# Patient Record
Sex: FEMALE | Race: OTHER | NOT HISPANIC OR LATINO | URBAN - METROPOLITAN AREA
[De-identification: names, ages, dates, MRNs, and addresses within clinical notes are randomized per-mention and may not be internally consistent; named-entity substitution may affect disease eponyms.]

---

## 2024-11-21 ENCOUNTER — APPOINTMENT (OUTPATIENT)
Dept: ENDOCRINOLOGY | Facility: CLINIC | Age: 54
End: 2024-11-21
Payer: COMMERCIAL

## 2024-12-26 ENCOUNTER — APPOINTMENT (OUTPATIENT)
Dept: ENDOCRINOLOGY | Facility: CLINIC | Age: 54
End: 2024-12-26
Payer: COMMERCIAL

## 2024-12-26 VITALS — WEIGHT: 220 LBS | HEIGHT: 62 IN | BODY MASS INDEX: 40.48 KG/M2

## 2024-12-26 DIAGNOSIS — L65.9 HAIR THINNING: ICD-10-CM

## 2024-12-26 DIAGNOSIS — E66.01 CLASS 3 SEVERE OBESITY WITH SERIOUS COMORBIDITY AND BODY MASS INDEX (BMI) OF 40.0 TO 44.9 IN ADULT, UNSPECIFIED OBESITY TYPE: Primary | ICD-10-CM

## 2024-12-26 DIAGNOSIS — E66.813 CLASS 3 SEVERE OBESITY WITH SERIOUS COMORBIDITY AND BODY MASS INDEX (BMI) OF 40.0 TO 44.9 IN ADULT, UNSPECIFIED OBESITY TYPE: Primary | ICD-10-CM

## 2024-12-26 DIAGNOSIS — E88.819 INSULIN RESISTANCE: ICD-10-CM

## 2024-12-26 PROCEDURE — 1036F TOBACCO NON-USER: CPT | Performed by: INTERNAL MEDICINE

## 2024-12-26 PROCEDURE — 99215 OFFICE O/P EST HI 40 MIN: CPT | Performed by: INTERNAL MEDICINE

## 2024-12-26 PROCEDURE — 3008F BODY MASS INDEX DOCD: CPT | Performed by: INTERNAL MEDICINE

## 2024-12-26 ASSESSMENT — PATIENT HEALTH QUESTIONNAIRE - PHQ9
SUM OF ALL RESPONSES TO PHQ9 QUESTIONS 1 AND 2: 0
2. FEELING DOWN, DEPRESSED OR HOPELESS: NOT AT ALL
1. LITTLE INTEREST OR PLEASURE IN DOING THINGS: NOT AT ALL

## 2024-12-26 NOTE — PROGRESS NOTES
Consults    Reason For Consult  Insulin resistance and weight    History Of Present Illness  Yvonne Iyer is a 54 y.o. female presenting with weight issues and insulin resistence .     Syndargy started by her MD  Wanted to establish a follow up   Last seen in 2021     she always suffered difficulty loosing weight  she has had elevated cortisol  hair thinning     her grandmother large lipidemia like symptoms      she eats very healthy  she is not as active a she wants to be     In  ( 2019) with functional medicine, found high thyroid and high cortisol  she was provided supplements she is taking    since last visit had elevated liver enzymes, fatty liver and gallbladder stone 3 cm, removed surgically in [place]   Any family history of thyroid cancer? no  Any personal history of radiation to your head/neck? no    Past Medical History  She has a past medical history of Disorder of thyroid, unspecified (11/02/2021).    Surgical History  She has a past surgical history that includes Other surgical history (11/02/2021).     Social History  She reports that she has never smoked. She has never used smokeless tobacco. No history on file for alcohol use and drug use.    Family History  No family history on file.     Allergies  Patient has no known allergies.    Review of Systems    Past Medical History:   Diagnosis Date    Disorder of thyroid, unspecified 11/02/2021    Disease of thyroid gland       Past Surgical History:   Procedure Laterality Date    OTHER SURGICAL HISTORY  11/02/2021    Cholecystectomy       Social History     Socioeconomic History    Marital status:      Spouse name: Not on file    Number of children: Not on file    Years of education: Not on file    Highest education level: Not on file   Occupational History    Not on file   Tobacco Use    Smoking status: Never    Smokeless tobacco: Never   Substance and Sexual Activity    Alcohol use: Not on file    Drug use: Not on file    Sexual activity: Not on  "file   Other Topics Concern    Not on file   Social History Narrative    Not on file     Social Drivers of Health     Financial Resource Strain: Not on file   Food Insecurity: Not on file   Transportation Needs: Not on file   Physical Activity: Not on file   Stress: Not on file   Social Connections: Not on file   Intimate Partner Violence: Not on file   Housing Stability: Not on file        Physical Exam     ROS, PMH, FH/SH, surgical history and allergies have been reviewed.    Last Recorded Vitals  Height 1.575 m (5' 2\"), weight 99.8 kg (220 lb).    Relevant Results         If you would like to pull in Medications, type .meds     If you would like to pull in Lab results for the last 24 hours, type .[id number]    If you would like to pull in specific Lab results, type .ll     If you would like to pull in Imaging results, type .imgrslt :99}  Lab Review  Lab Results   Component Value Date    BILITOT 0.5 11/18/2021    CALCIUM 9.3 11/18/2021    CO2 29 11/18/2021     11/18/2021    CREATININE 0.65 11/18/2021    GLUCOSE 91 11/18/2021    ALKPHOS 69 11/18/2021    K 4.2 11/18/2021    PROT 7.5 11/18/2021     11/18/2021    AST 15 11/18/2021    ALT 14 11/18/2021    BUN 10 11/18/2021    ANIONGAP 12 11/18/2021    ALBUMIN 4.1 11/18/2021     Lab Results   Component Value Date    TRIG 102 11/18/2021    CHOL 199 11/18/2021    HDL 64.0 11/18/2021     Lab Results   Component Value Date    HGBA1C 5.7 07/24/2021     The ASCVD Risk score (Stiven HARRELL, et al., 2019) failed to calculate for the following reasons:    The systolic blood pressure is missing    Cannot find a previous HDL lab    Cannot find a previous total cholesterol lab       Assessment/Plan   Problem List Items Addressed This Visit             ICD-10-CM    Class 3 severe obesity with serious comorbidity and body mass index (BMI) of 40.0 to 44.9 in adult - Primary E66.813, Z68.41, E66.01    Relevant Orders    CBC    Comprehensive Metabolic Panel    Hemoglobin A1C    " Lipid Panel    C-Reactive Protein    TSH with reflex to Free T4 if abnormal    Adrenocorticotropic Hormone (ACTH)    Cortisol    DHEA-Sulfate    Testosterone,Free and Total    Insulin resistance E88.819    Relevant Orders    CBC    Comprehensive Metabolic Panel    Hemoglobin A1C    Lipid Panel    C-Reactive Protein    TSH with reflex to Free T4 if abnormal    Adrenocorticotropic Hormone (ACTH)    Cortisol    DHEA-Sulfate    Testosterone,Free and Total    Hair thinning L65.9    Relevant Orders    CBC    Comprehensive Metabolic Panel    Hemoglobin A1C    Lipid Panel    C-Reactive Protein    TSH with reflex to Free T4 if abnormal    Adrenocorticotropic Hormone (ACTH)    Cortisol    DHEA-Sulfate    Testosterone,Free and Total            abnormal thyroid test and elevated cortisol from 2019   H/o elevated liver enzymes          we will do blood tests to evaluate for HARO, most likely PCOS and insulin resistance     hypercortisolemia with FH , raising possibility of insulin resistance or   Consider wegovy or zepbound    Also referral to PCP , she needs physical and routine screenings      I spent a total of 40+ minutes on the date of the service which included preparing to see the patient, face-to-face patient care, completing clinical documentation, obtaining and / or reviewing separately obtained history, counseling and educating the patient/family/caregiver, ordering medications, tests, or procedures, communicating with other healthcare providers (not separately reported), independently interpreting results, not separately reported, and communicating results to the patient/family/caregiver, real-time telemedicine visit using audiovisual technology with patient's verbal consent.  Name and date of birth verified.        Chris De Luna MD

## 2025-08-19 ENCOUNTER — APPOINTMENT (OUTPATIENT)
Dept: ENDOCRINOLOGY | Facility: CLINIC | Age: 55
End: 2025-08-19
Payer: COMMERCIAL

## 2025-08-19 DIAGNOSIS — E24.9 HYPERCORTISOLISM (MULTI): Primary | ICD-10-CM

## 2025-08-19 DIAGNOSIS — K75.81 METABOLIC DYSFUNCTION-ASSOCIATED STEATOHEPATITIS (MASH): ICD-10-CM

## 2025-08-19 DIAGNOSIS — L65.9 HAIR LOSS DISORDER: ICD-10-CM

## 2025-08-19 PROCEDURE — 1036F TOBACCO NON-USER: CPT | Performed by: INTERNAL MEDICINE

## 2025-08-19 PROCEDURE — 99214 OFFICE O/P EST MOD 30 MIN: CPT | Performed by: INTERNAL MEDICINE

## 2025-08-19 RX ORDER — SEMAGLUTIDE 0.25 MG/.5ML
0.25 INJECTION, SOLUTION SUBCUTANEOUS
Qty: 2 ML | Refills: 5 | Status: SHIPPED | OUTPATIENT
Start: 2025-08-19

## 2025-09-04 ENCOUNTER — APPOINTMENT (OUTPATIENT)
Dept: ENDOCRINOLOGY | Facility: CLINIC | Age: 55
End: 2025-09-04
Payer: COMMERCIAL